# Patient Record
Sex: FEMALE | URBAN - METROPOLITAN AREA
[De-identification: names, ages, dates, MRNs, and addresses within clinical notes are randomized per-mention and may not be internally consistent; named-entity substitution may affect disease eponyms.]

---

## 2021-05-18 ENCOUNTER — NEW PATIENT ROUTINE/VISION (OUTPATIENT)
Dept: URBAN - METROPOLITAN AREA CLINIC 53 | Facility: CLINIC | Age: 46
End: 2021-05-18

## 2021-05-18 DIAGNOSIS — H04.123: ICD-10-CM

## 2021-05-18 DIAGNOSIS — Z01.00: ICD-10-CM

## 2021-05-18 DIAGNOSIS — H52.4: ICD-10-CM

## 2021-05-18 PROCEDURE — 92004 COMPRE OPH EXAM NEW PT 1/>: CPT

## 2021-05-18 PROCEDURE — 92015 DETERMINE REFRACTIVE STATE: CPT

## 2021-05-18 ASSESSMENT — VISUAL ACUITY
OS_SC: 20/40
OD_GLARE: 20/30
OD_CC: J5@ 16 IN
OD_SC: 20/30
OU_SC: J5@ 16 IN
OD_CC: 20/25
OD_GLARE: 20/25
OU_SC: 20/30
OU_CC: 20/20
OS_GLARE: 20/30
OS_GLARE: 20/25
OS_CC: 20/25

## 2021-05-18 ASSESSMENT — KERATOMETRY
OS_K2POWER_DIOPTERS: 41.50
OS_K1POWER_DIOPTERS: 43.25
OS_AXISANGLE2_DEGREES: 9
OS_AXISANGLE_DEGREES: 099
OD_AXISANGLE2_DEGREES: 180
OD_K2POWER_DIOPTERS: 41.50
OD_AXISANGLE_DEGREES: 090
OD_K1POWER_DIOPTERS: 42.75

## 2021-05-18 ASSESSMENT — TONOMETRY
OS_IOP_MMHG: 16
OD_IOP_MMHG: 16

## 2021-05-18 NOTE — PATIENT DISCUSSION
Patient given Rx for glasses. Patient interested in contact lenses. Will schedule patient for a new contact lens fitting.